# Patient Record
Sex: FEMALE | Race: WHITE
[De-identification: names, ages, dates, MRNs, and addresses within clinical notes are randomized per-mention and may not be internally consistent; named-entity substitution may affect disease eponyms.]

---

## 2018-03-17 ENCOUNTER — HOSPITAL ENCOUNTER (EMERGENCY)
Dept: HOSPITAL 63 - ER | Age: 63
Discharge: HOME | End: 2018-03-17
Payer: SELF-PAY

## 2018-03-17 VITALS — WEIGHT: 137 LBS | HEIGHT: 61 IN | BODY MASS INDEX: 25.86 KG/M2

## 2018-03-17 VITALS — DIASTOLIC BLOOD PRESSURE: 70 MMHG | SYSTOLIC BLOOD PRESSURE: 132 MMHG

## 2018-03-17 DIAGNOSIS — B34.9: Primary | ICD-10-CM

## 2018-03-17 DIAGNOSIS — J44.9: ICD-10-CM

## 2018-03-17 DIAGNOSIS — F17.210: ICD-10-CM

## 2018-03-17 DIAGNOSIS — J40: ICD-10-CM

## 2018-03-17 LAB
INFLUENZA A PATIENT: NEGATIVE
INFLUENZA B PATIENT: NEGATIVE

## 2018-03-17 PROCEDURE — 87804 INFLUENZA ASSAY W/OPTIC: CPT

## 2018-03-17 PROCEDURE — 87070 CULTURE OTHR SPECIMN AEROBIC: CPT

## 2018-03-17 PROCEDURE — 81001 URINALYSIS AUTO W/SCOPE: CPT

## 2018-03-17 PROCEDURE — 36415 COLL VENOUS BLD VENIPUNCTURE: CPT

## 2018-03-17 PROCEDURE — 99284 EMERGENCY DEPT VISIT MOD MDM: CPT

## 2018-03-17 PROCEDURE — 85025 COMPLETE CBC W/AUTO DIFF WBC: CPT

## 2018-03-17 PROCEDURE — 87880 STREP A ASSAY W/OPTIC: CPT

## 2018-03-17 PROCEDURE — 80053 COMPREHEN METABOLIC PANEL: CPT

## 2018-03-17 NOTE — ED.ADGEN
Past History


Past Medical History:  No Pertinent History, COPD, Other


Past Surgical History:  Other


Smoking:  Cigarettes


Alcohol Use:  None


Drug Use:  None





Adult General


Chief Complaint


Chief Complaint


"  I coughing up some white stuff  . and then . It was green.. I feel like I 

got the flu or something.. I ve been sick since monday.. Fever... coughing... I 

ve cut back on my smoking....." my nose runs constantly. "





HPI


HPI





Patient is a 62 year old female who presents with above hx and complaints of 

fever, chills and coughing. Pt. denies any travel or specific ill contacts.   

Pt. does smoke and has hx of chronic bronchitis.      Pt. denies any 

immunosuppression.   Pt. has hx or episodes of bronchitis in past and Hx. of 

HTN.   Pt. has hx of allergies usually seasonal or with changes in weather.  

Pt. complaints of nasal congestion and drainage.   Mild pharyngeal soreness.





Review of Systems


Review of Systems





Constitutional:Subjecteive hx of fever or chills []


Eyes: Denies change in visual acuity, redness, or eye pain []


HENT:  Hx of nasal congestion and sore throat []


Respiratory: occasional  cough  and wheezing. 


Cardiovascular: No additional information not addressed in HPI []


GI: Denies abdominal pain, nausea, vomiting, bloody stools or diarrhea []


: Denies dysuria or hematuria []


Musculoskeletal: Denies back pain or joint pain []


Integument: Denies rash or skin lesions []


Neurologic: Denies headache, focal weakness or sensory changes []


Endocrine: Denies polyuria or polydipsia []





All other systems were reviewed and found to be within normal limits, except as 

documented in this note.





Family History


Family History


Noncontributory





Current Medications


Current Medications


See Nursing for home.





Allergies


Allergies





Allergies








Coded Allergies Type Severity Reaction Last Updated Verified


 


  No Known Drug Allergies    3/18/18 No











Physical Exam


Physical Exam





Constitutional: mild  distress, non-toxic appearance. []


HENT: Normocephalic, atraumatic, bilateral external ears normal, oropharynx 

moist,mild injection of pharynx.,  no oral exudates, nose rhinorrhea. 


Eyes: PERRLA, EOMI, conjunctiva normal, no discharge. [] 


Neck: Normal range of motion, no tenderness, supple, no stridor. [] 


Cardiovascular:Heart rate regular rhythm, no murmur []


Lungs & Thorax:  Bilateral breath sounds  equal at apex with scattered wheezes 

on  auscultation []


Abdomen: Bowel sounds normal, soft, no tenderness, no masses, no pulsatile 

masses. [] 


Skin: Warm, dry, no erythema, no rash. [] 


Back: No tenderness, no CVA tenderness. [] 


Extremities: No tenderness, no cyanosis, no clubbing, ROM intact, no edema. []  

No cording. 


Neurologic: Alert and oriented X 3, normal motor function, normal sensory 

function, no focal deficits noted. []


Psychologic: Affect normal, judgement normal, mood normal. []





Current Patient Data


Lab Results





 Laboratory Tests








Test


  3/17/18


21:16


 


Influenza Type A (Rapid)


  Negative


(NEGATIVE)


 


Influenza Type B (Rapid)


  Negative


(NEGATIVE)


 


Group A Streptococcus Rapid


  Negative


(NEGATIVE)











EKG


EKG


[]





Radiology/Procedures


Radiology/Procedures


[]





Course & Med Decision Making


Course & Med Decision Making


Pertinent Labs and Imaging studies reviewed. (See chart for details).  Pt. to 

take tylenol and ibuprofen of discomfort or fever.   Push fluids.   Vit. C 

drinks.   Benadryl for congestion and nasal drainage.   STOP smoking.   .  

Follow up with primary.   Return to night even if any concerns.





[]





Final Impression


Final Impression


1. Viral Syndrome


2. Tobacco Abuse


3. Bronchitis[]


Problems:  





Dragon Disclaimer


Dragon Disclaimer


This electronic medical record was generated, in whole or in part, using a 

voice recognition dictation system.











JAYY MEANS MD Mar 17, 2018 21:03

## 2018-03-19 ENCOUNTER — HOSPITAL ENCOUNTER (EMERGENCY)
Dept: HOSPITAL 63 - ER | Age: 63
Discharge: HOME | End: 2018-03-19
Payer: SELF-PAY

## 2018-03-19 VITALS — WEIGHT: 138.67 LBS | HEIGHT: 61 IN | BODY MASS INDEX: 26.18 KG/M2

## 2018-03-19 VITALS — SYSTOLIC BLOOD PRESSURE: 147 MMHG | DIASTOLIC BLOOD PRESSURE: 84 MMHG

## 2018-03-19 DIAGNOSIS — I10: ICD-10-CM

## 2018-03-19 DIAGNOSIS — F17.210: ICD-10-CM

## 2018-03-19 DIAGNOSIS — J44.9: ICD-10-CM

## 2018-03-19 DIAGNOSIS — B34.9: Primary | ICD-10-CM

## 2018-03-19 LAB
ALBUMIN SERPL-MCNC: 3.3 G/DL (ref 3.4–5)
ALBUMIN/GLOB SERPL: 0.9 {RATIO} (ref 1–1.7)
ALP SERPL-CCNC: 106 U/L (ref 46–116)
ALT SERPL-CCNC: 36 U/L (ref 14–59)
ANION GAP SERPL CALC-SCNC: 13 MMOL/L (ref 6–14)
APTT PPP: YELLOW S
AST SERPL-CCNC: 27 U/L (ref 15–37)
BACTERIA #/AREA URNS HPF: 0 /HPF
BASOPHILS # BLD AUTO: 0 X10^3/UL (ref 0–0.2)
BASOPHILS NFR BLD: 0 % (ref 0–3)
BILIRUB SERPL-MCNC: 0.3 MG/DL (ref 0.2–1)
BILIRUB UR QL STRIP: (no result)
BUN/CREAT SERPL: 17 (ref 6–20)
CA-I SERPL ISE-MCNC: 19 MG/DL (ref 7–20)
CALCIUM SERPL-MCNC: 9.3 MG/DL (ref 8.5–10.1)
CHLORIDE SERPL-SCNC: 107 MMOL/L (ref 98–107)
CO2 SERPL-SCNC: 23 MMOL/L (ref 21–32)
CREAT SERPL-MCNC: 1.1 MG/DL (ref 0.6–1)
EOSINOPHIL NFR BLD: 0 % (ref 0–3)
EOSINOPHIL NFR BLD: 0 X10^3/UL (ref 0–0.7)
ERYTHROCYTE [DISTWIDTH] IN BLOOD BY AUTOMATED COUNT: 13.2 % (ref 11.5–14.5)
FIBRINOGEN PPP-MCNC: CLEAR MG/DL
GFR SERPLBLD BASED ON 1.73 SQ M-ARVRAT: 50.5 ML/MIN
GLOBULIN SER-MCNC: 3.8 G/DL (ref 2.2–3.8)
GLUCOSE SERPL-MCNC: 99 MG/DL (ref 70–99)
GLUCOSE UR STRIP-MCNC: (no result) MG/DL
HCT VFR BLD CALC: 40.1 % (ref 36–47)
HGB BLD-MCNC: 13.9 G/DL (ref 12–15.5)
HYALINE CASTS #/AREA URNS LPF: (no result) /HPF
LYMPHOCYTES # BLD: 2.9 X10^3/UL (ref 1–4.8)
LYMPHOCYTES NFR BLD AUTO: 47 % (ref 24–48)
MCH RBC QN AUTO: 30 PG (ref 25–35)
MCHC RBC AUTO-ENTMCNC: 35 G/DL (ref 31–37)
MCV RBC AUTO: 85 FL (ref 79–100)
MONO #: 0.5 X10^3/UL (ref 0–1.1)
MONOCYTES NFR BLD: 8 % (ref 0–9)
NEUT #: 2.8 X10^3UL (ref 1.8–7.7)
NEUTROPHILS NFR BLD AUTO: 45 % (ref 31–73)
NITRITE UR QL STRIP: (no result)
PLATELET # BLD AUTO: 196 X10^3/UL (ref 140–400)
POTASSIUM SERPL-SCNC: 3.9 MMOL/L (ref 3.5–5.1)
PROT SERPL-MCNC: 7.1 G/DL (ref 6.4–8.2)
RBC # BLD AUTO: 4.71 X10^6/UL (ref 3.5–5.4)
RBC #/AREA URNS HPF: (no result) /HPF (ref 0–2)
SODIUM SERPL-SCNC: 143 MMOL/L (ref 136–145)
SP GR UR STRIP: 1.01
SQUAMOUS #/AREA URNS LPF: (no result) /LPF
UROBILINOGEN UR-MCNC: 0.2 MG/DL
WBC # BLD AUTO: 6.2 X10^3/UL (ref 4–11)
WBC #/AREA URNS HPF: (no result) /HPF (ref 0–4)

## 2018-03-19 PROCEDURE — 99284 EMERGENCY DEPT VISIT MOD MDM: CPT

## 2018-03-19 PROCEDURE — 96374 THER/PROPH/DIAG INJ IV PUSH: CPT

## 2018-03-19 PROCEDURE — 71046 X-RAY EXAM CHEST 2 VIEWS: CPT

## 2018-03-19 PROCEDURE — 96361 HYDRATE IV INFUSION ADD-ON: CPT

## 2018-03-19 NOTE — PHYS DOC
Past History


Past Medical History:  COPD, Hypertension, Other


Past Surgical History:  Other


Smoking:  Cigarettes


Alcohol Use:  None


Drug Use:  None





Adult General


Chief Complaint


Chief Complaint:  Influenza





HPI


HPI





Patient is a 62-year-old female who presents here today complaining of 

generalized fatigue, generalized malaise, nonproductive cough, nausea, 

generalized weakness for approximately 1 week now. Patient reports that she 

came to the ER on Saturday was diagnosed with a viral illness and was told that 

she might have the flu. Patient was discharged home with therapeutic 

medications and instructed to return the ER she's not getting any better. 

Patient presents today she has been feeling worse. Daughter reports her mother 

is been having decreased by mouth intake, tactile fevers, nausea, no dysuria 

frequency or urgency, positive nonproductive cough. Sore throat. Ear pain.





Review of systems:


Constitutional: Denies fever or chills 


Eyes: Denies change in visual acuity, redness, or eye pain 


HENT: Denies nasal congestion or sore throat 


Respiratory: Denies cough or shortness of breath 





All other systems were reviewed and found to be within normal limits, except as 

documented in this note.





Physical exam: 


Constitutional: Well developed, well nourished, no acute distress, non-toxic 

appearance. 


HENT: Normocephalic, atraumatic, bilateral external ears normal,  nose normal. 


Eyes: PERRLA, EOMI, conjunctiva normal, no discharge.  


Neck: Normal range of motion, no tenderness, supple, no stridor.  


Cardiovascular: Heart rate regular rhythm,


Lungs & Thorax:  Bilateral breath sounds clear to auscultation 


Abdomen: No abdominal distention.


Skin: Warm, dry, no erythema, no rash.  


Back: Normal spinal curvature


Extremities: No tenderness, no cyanosis, no clubbing, ROM intact, no edema.  


Neurologic: Alert and oriented X 3, normal motor function, normal sensory 

function, no focal deficits noted. 


Psychologic: Affect normal, judgement normal, mood normal. 





Patient's ER physical exam was most remarkable: Lungs are clear without any 

wheezing rales or rhonchi. TMs are clear. Oropharynx clear. Abdomen was benign.


Abdomen soft nontender no rebound or guarding NABS. No Price sign, no 

tenderness to McBurney's point. Patient not present with any signs or symptoms 

of be consistent with an acute surgical abdomen.








Chest x-ray as interpreted by ER physician reveals: Normal heart no infiltrates 

or effusions.


Labs reviewed: Influenza a and B were negative. Strep screen negative.





Labs drawn today: CBC, CMP within normal limits.





Assessment and plan:


1.  62-year-old female who presents here today secondary to generalized malaise 

and likely viral illness. Patient's ER workup is been unremarkable. Patient 

reports generalized weakness, decreased sleep secondary to cough, diffuse 

malaise and myalgias. Patient's symptoms are consistent with a acute viral 

illness. Patient's currently afebrile and nontoxic appearing. Patient will be 

discharged home with some specks therapy including Tylenol as needed for her 

malaise as well as Phenergan with codeine to assist her to get some sleep at 

night with her cough. Patient be instructed to follow-up with her primary care 

physician within 2-3 days for reevaluation and to return to the ER. Discharged 

developing any shortness of breath or any other new symptoms that may be 

concerning.





Current Medications


Current Medications





Current Medications








 Medications


  (Trade)  Dose


 Ordered  Sig/Mahad  Start Time


 Stop Time Status Last Admin


Dose Admin


 


 Ketorolac


 Tromethamine


  (Toradol)  15 mg  1X  ONCE  3/19/18 21:15


 3/19/18 21:16 DC 3/19/18 21:09


15 MG


 


 Promethazine HCl/


 Codeine


  (Phenergan With


 Codeine)  10 ml  1X  ONCE  3/19/18 22:15


 3/19/18 22:16 DC 3/19/18 22:08


10 ML


 


 Sodium Chloride  1,000 ml @ 


 1,000 mls/hr  1X  ONCE  3/19/18 21:15


 3/19/18 22:14 DC 3/19/18 21:09


1,000 MLS/HR











Allergies


Allergies





Allergies








Coded Allergies Type Severity Reaction Last Updated Verified


 


  No Known Drug Allergies    3/18/18 No











Current Patient Data


Vital Signs





 Vital Signs








  Date Time  Temp Pulse Resp B/P (MAP) Pulse Ox O2 Delivery O2 Flow Rate FiO2


 


3/19/18 20:07 98.4 72 18  98 Room Air  











EKG


EKG


[]





Radiology/Procedures


Radiology/Procedures


[]





Course & Med Decision Making


Course & Med Decision Making


Pertinent Labs and Imaging studies reviewed. (See chart for details)





[]





Dragon Disclaimer


Dragon Disclaimer


This electronic medical record was generated, in whole or in part, using a 

voice recognition dictation system.





Departure


Departure:


Impression:  


 Primary Impression:  


 Viral illness


Disposition:  01 HOME, SELF-CARE


Condition:  IMPROVED


Referrals:  


PCP,NO (PCP)


Patient Instructions:  Viral Syndrome


Scripts


Promethazine HCl/Codeine (Prometh-Codein 6.25-10 mg/5 ml) 5 Ml Syrup


10 ML PO HS Y for COUGH, #120 ML


   Prov: JUSTICE LAWRENCE MD         3/19/18











JUSTICE LAWRENCE MD Mar 19, 2018 22:42

## 2018-03-20 NOTE — RAD
Chest, 2 views, 3/19/2018:



History: Cough, congestion, fatigue



Comparison is made to a study from 10/14/2012.  The heart size and pulmonary

vascularity are normal.  No pulmonary infiltrate is seen.  There is no

evidence of pleural fluid.  Mild spurring is present in the spine.  A surgical

plate and screws is evident in the lower cervical region.



IMPRESSION: No acute cardiopulmonary abnormality is detected.

## 2021-12-07 ENCOUNTER — HOSPITAL ENCOUNTER (EMERGENCY)
Dept: HOSPITAL 63 - ER | Age: 66
Discharge: ANOTHER HOSPITAL | End: 2021-12-07
Payer: MEDICARE

## 2021-12-07 VITALS — DIASTOLIC BLOOD PRESSURE: 84 MMHG | SYSTOLIC BLOOD PRESSURE: 144 MMHG

## 2021-12-07 VITALS — BODY MASS INDEX: 26.18 KG/M2 | WEIGHT: 138.67 LBS | HEIGHT: 61 IN

## 2021-12-07 DIAGNOSIS — I21.4: ICD-10-CM

## 2021-12-07 DIAGNOSIS — R79.1: ICD-10-CM

## 2021-12-07 DIAGNOSIS — I10: ICD-10-CM

## 2021-12-07 DIAGNOSIS — Z20.822: ICD-10-CM

## 2021-12-07 DIAGNOSIS — F17.210: ICD-10-CM

## 2021-12-07 DIAGNOSIS — E11.9: ICD-10-CM

## 2021-12-07 DIAGNOSIS — R74.02: ICD-10-CM

## 2021-12-07 DIAGNOSIS — J96.91: Primary | ICD-10-CM

## 2021-12-07 DIAGNOSIS — J44.9: ICD-10-CM

## 2021-12-07 LAB
ALBUMIN SERPL-MCNC: 3.4 G/DL (ref 3.4–5)
ALP SERPL-CCNC: 128 U/L (ref 46–116)
ALT SERPL-CCNC: 41 U/L (ref 14–59)
AMPHETAMINE/METHAMPHETAMINE: (no result)
ANION GAP SERPL CALC-SCNC: 13 MMOL/L (ref 6–14)
APTT PPP: YELLOW S
AST SERPL-CCNC: 60 U/L (ref 15–37)
BACTERIA #/AREA URNS HPF: 0 /HPF
BARBITURATES UR-MCNC: (no result) UG/ML
BASOPHILS # BLD AUTO: 0.1 X10^3/UL (ref 0–0.2)
BASOPHILS NFR BLD: 1 % (ref 0–3)
BENZODIAZ UR-MCNC: (no result) UG/L
BGAS PCO2: 43 MMHG (ref 35–45)
BGAS PH: 7.31 (ref 7.35–7.45)
BGAS PO2: 88 MMHG (ref 80–100)
BILIRUB DIRECT SERPL-MCNC: 0.1 MG/DL (ref 0–0.2)
BILIRUB SERPL-MCNC: 0.3 MG/DL (ref 0.2–1)
BILIRUB UR QL STRIP: (no result)
CA-I SERPL ISE-MCNC: 17 MG/DL (ref 7–20)
CALCIUM SERPL-MCNC: 7.9 MG/DL (ref 8.5–10.1)
CANNABINOIDS UR-MCNC: (no result) UG/L
CHLORIDE SERPL-SCNC: 103 MMOL/L (ref 98–107)
CHOLEST/HDLC SERPL: 8 {RATIO}
CO2 SERPL-SCNC: 22 MMOL/L (ref 21–32)
COCAINE UR-MCNC: (no result) NG/ML
CREAT SERPL-MCNC: 1 MG/DL (ref 0.6–1)
DELTA BASE BGAS: -5 MMOL/L (ref 0–3)
EOSINOPHIL NFR BLD: 0.3 X10^3/UL (ref 0–0.7)
EOSINOPHIL NFR BLD: 2 % (ref 0–3)
ERYTHROCYTE [DISTWIDTH] IN BLOOD BY AUTOMATED COUNT: 13.9 % (ref 11.5–14.5)
FIBRINOGEN PPP-MCNC: CLEAR MG/DL
GFR SERPLBLD BASED ON 1.73 SQ M-ARVRAT: 55.6 ML/MIN
GLUCOSE SERPL-MCNC: 260 MG/DL (ref 70–99)
GLUCOSE UR STRIP-MCNC: (no result) MG/DL
HCT VFR BLD CALC: 38 % (ref 36–47)
HDLC SERPL-MCNC: 29 MG/DL (ref 40–60)
HGB BLD-MCNC: 12.5 G/DL (ref 12–15.5)
INFLUENZA A PATIENT: NEGATIVE
INFLUENZA B PATIENT: NEGATIVE
LDLC: 185 MG/DL (ref 0–100)
LIPASE: 57 U/L (ref 73–393)
LYMPHOCYTES # BLD: 6.8 X10^3/UL (ref 1–4.8)
LYMPHOCYTES NFR BLD AUTO: 48 % (ref 24–48)
MAGNESIUM SERPL-MCNC: 2.2 MG/DL (ref 1.8–2.4)
MCH RBC QN AUTO: 29 PG (ref 25–35)
MCHC RBC AUTO-ENTMCNC: 33 G/DL (ref 31–37)
MCV RBC AUTO: 87 FL (ref 79–100)
METHADONE SERPL-MCNC: (no result) NG/ML
MONO #: 1 X10^3/UL (ref 0–1.1)
MONOCYTES NFR BLD: 7 % (ref 0–9)
NEUT #: 6.1 X10^3UL (ref 1.8–7.7)
NEUTROPHILS NFR BLD AUTO: 43 % (ref 31–73)
NITRITE UR QL STRIP: (no result)
O2 SAT BGAS: 96 % (ref 92–99)
O2/TOTAL GAS SETTING VFR VENT: 50 %
OPIATES UR-MCNC: (no result) NG/ML
PCP SERPL-MCNC: (no result) MG/DL
PLATELET # BLD AUTO: 307 X10^3/UL (ref 140–400)
POTASSIUM SERPL-SCNC: 4.5 MMOL/L (ref 3.5–5.1)
PROT SERPL-MCNC: 6.9 G/DL (ref 6.4–8.2)
RBC # BLD AUTO: 4.35 X10^6/UL (ref 3.5–5.4)
RBC #/AREA URNS HPF: (no result) /HPF (ref 0–2)
SODIUM SERPL-SCNC: 138 MMOL/L (ref 136–145)
SP GR UR STRIP: 1.01
SQUAMOUS #/AREA URNS LPF: (no result) /LPF
TRIGL SERPL-MCNC: 144 MG/DL (ref 0–150)
UROBILINOGEN UR-MCNC: 0.2 MG/DL
VLDLC: 28 MG/DL (ref 0–40)
WBC # BLD AUTO: 14.3 X10^3/UL (ref 4–11)
WBC #/AREA URNS HPF: (no result) /HPF (ref 0–4)

## 2021-12-07 PROCEDURE — 99292 CRITICAL CARE ADDL 30 MIN: CPT

## 2021-12-07 PROCEDURE — 36415 COLL VENOUS BLD VENIPUNCTURE: CPT

## 2021-12-07 PROCEDURE — 80076 HEPATIC FUNCTION PANEL: CPT

## 2021-12-07 PROCEDURE — 85025 COMPLETE CBC W/AUTO DIFF WBC: CPT

## 2021-12-07 PROCEDURE — 96374 THER/PROPH/DIAG INJ IV PUSH: CPT

## 2021-12-07 PROCEDURE — U0003 INFECTIOUS AGENT DETECTION BY NUCLEIC ACID (DNA OR RNA); SEVERE ACUTE RESPIRATORY SYNDROME CORONAVIRUS 2 (SARS-COV-2) (CORONAVIRUS DISEASE [COVID-19]), AMPLIFIED PROBE TECHNIQUE, MAKING USE OF HIGH THROUGHPUT TECHNOLOGIES AS DESCRIBED BY CMS-2020-01-R: HCPCS

## 2021-12-07 PROCEDURE — 94660 CPAP INITIATION&MGMT: CPT

## 2021-12-07 PROCEDURE — 96375 TX/PRO/DX INJ NEW DRUG ADDON: CPT

## 2021-12-07 PROCEDURE — 83880 ASSAY OF NATRIURETIC PEPTIDE: CPT

## 2021-12-07 PROCEDURE — C9803 HOPD COVID-19 SPEC COLLECT: HCPCS

## 2021-12-07 PROCEDURE — 83690 ASSAY OF LIPASE: CPT

## 2021-12-07 PROCEDURE — 93005 ELECTROCARDIOGRAM TRACING: CPT

## 2021-12-07 PROCEDURE — 87040 BLOOD CULTURE FOR BACTERIA: CPT

## 2021-12-07 PROCEDURE — 85730 THROMBOPLASTIN TIME PARTIAL: CPT

## 2021-12-07 PROCEDURE — 80307 DRUG TEST PRSMV CHEM ANLYZR: CPT

## 2021-12-07 PROCEDURE — 87804 INFLUENZA ASSAY W/OPTIC: CPT

## 2021-12-07 PROCEDURE — 84484 ASSAY OF TROPONIN QUANT: CPT

## 2021-12-07 PROCEDURE — 82803 BLOOD GASES ANY COMBINATION: CPT

## 2021-12-07 PROCEDURE — 85610 PROTHROMBIN TIME: CPT

## 2021-12-07 PROCEDURE — 83735 ASSAY OF MAGNESIUM: CPT

## 2021-12-07 PROCEDURE — 85379 FIBRIN DEGRADATION QUANT: CPT

## 2021-12-07 PROCEDURE — 80048 BASIC METABOLIC PNL TOTAL CA: CPT

## 2021-12-07 PROCEDURE — 84443 ASSAY THYROID STIM HORMONE: CPT

## 2021-12-07 PROCEDURE — 80061 LIPID PANEL: CPT

## 2021-12-07 PROCEDURE — 83605 ASSAY OF LACTIC ACID: CPT

## 2021-12-07 PROCEDURE — 96372 THER/PROPH/DIAG INJ SC/IM: CPT

## 2021-12-07 PROCEDURE — 82550 ASSAY OF CK (CPK): CPT

## 2021-12-07 PROCEDURE — 94640 AIRWAY INHALATION TREATMENT: CPT

## 2021-12-07 PROCEDURE — 99291 CRITICAL CARE FIRST HOUR: CPT

## 2021-12-07 PROCEDURE — 36600 WITHDRAWAL OF ARTERIAL BLOOD: CPT

## 2021-12-07 PROCEDURE — 71045 X-RAY EXAM CHEST 1 VIEW: CPT

## 2021-12-07 PROCEDURE — 81001 URINALYSIS AUTO W/SCOPE: CPT

## 2021-12-07 PROCEDURE — 87426 SARSCOV CORONAVIRUS AG IA: CPT

## 2021-12-07 NOTE — RAD
INDICATION: Reason: resp. failure / Spl. Instructions:  / History: 



COMPARISON: March 2018



FINDINGS:



Single view of chest obtained.

Enlarged cardiomediastinal silhouette.

Postoperative changes status post fusion at the lower cervical spine partially seen.

Interstitial and alveolar opacities bilaterally moderate in severity there is also some disorganized 
pulmonary markings which could be from chronic lung disease.





IMPRESSION:



*  Interstitial and alveolar opacities bilaterally which could be from edema or bilateral infiltrate.




*  Enlarged cardiomediastinal silhouette.



*  Coarsened lung markings. Could be from chronic lung disease.



Electronically signed by: Chu Ashraf MD (12/7/2021 4:51 AM) [id number]

## 2021-12-07 NOTE — PHYS DOC
Past History


Past Medical History:  COPD, Hypertension, Other


Additional Past Medical Histor:  denies


Past Surgical History:  Other


Additional Past Surgical Histo:  neck


Smoking:  Cigarettes


Alcohol Use:  None


Drug Use:  None





General Adult


EDM:


Chief Complaint:  SHORTNESS OF BREATH





HPI:


HPI:


".  This been .... coming on ....for a while..  just worse.... tonight...."





Patient is a 66 year old female who presents with obvious respiratory failure.  

Pt. reports increased dyspnea last couple days.  Much more severe tonight.  Fay

ent has had past history of COPD exacerbations.  Reportedly has never had a MI 

or CHF in the past.  Patient does have oxygen at home which she uses for her 

COPD.   Patient has had previous elevated glucose levels and hypertension.  

Patient has excess of 40-pack-year smoking history patient normally follows with

Dr. Andujar.   Pt. has had  saturation in 80%  by Paramedics, but supplement 

100% NRBM - sat into 95%.  Pt. promptly placed on BiPAP  18/5 with rapid relief 

of dyspnea and hypoxia.   Patient has completed 1 COVID vaccination.  No recent 

travel.  No specific ill contacts.  No history immunosuppression.





Review of Systems:


Review of Systems:


Constitutional:  Denies fever or chills 


Eyes:  Denies change in visual acuity 


HENT:  Denies nasal congestion or sore throat 


Respiratory: Complains of shortness of breath 


Cardiovascular:  Denies chest pain or edema 


GI:  Denies abdominal pain, nausea, vomiting, bloody stools or diarrhea 


: Denies dysuria 


Musculoskeletal:  Denies back pain or joint pain 


Integument:  Denies rash 


Neurologic:  Denies headache, focal weakness or sensory changes 


Endocrine:  Denies polyuria or polydipsia 


Lymphatic:  Denies swollen glands 


Psychiatric:  Denies depression or anxiety





Family History:


Family History:


Noncontributory to presentation





Current Medications:


Current Meds:


See nursing for home meds





Allergies:


Allergies:





Allergies








Coded Allergies Type Severity Reaction Last Updated Verified


 


  No Known Drug Allergies    21 No











Physical Exam:


PE:





Constitutional:in acute distress, non-toxic appearance. []


HENT: Normocephalic, atraumatic, bilateral external ears normal, oropharynx 

moist, no oral exudates, nose normal. []


Eyes: PERRLA, EOMI, conjunctiva normal, no discharge. [] 


Neck: Normal range of motion, no tenderness, supple, no stridor.  JVD in sitting

 position


Cardiovascular: Tachycardia heart rate regular rhythm, no murmur [] bedside 

monitor shows sinus tach with a ventricular strain pattern


Lungs & Thorax:  Bilateral breath sounds equal apex with crackles and wheezing 

throughout auscultation []


Abdomen: Bowel sounds decreased, soft, no tenderness, no masses, no pulsatile 

masses. [] 


Skin: Warm, diaphoretic, no erythema, no rash. [] 


Back: No tenderness, no CVA tenderness. [] 


Extremities: No tenderness, no cyanosis, no clubbing, ROM intact, ankle edema.  

No cording appreciated.  Arthritic changes


Neurologic: Alert and oriented X 3, moves all extremities on request, does have 

distal sensory, no focal deficits noted. []


Psychologic: Affect anxious, judgement normal, mood normal. []





Current Patient Data:


Vital Signs:





                                   Vital Signs








  Date Time  Temp Pulse Resp B/P (MAP) Pulse Ox O2 Delivery O2 Flow Rate FiO2


 


21 02:50  113 40 147/84 (105) 98 NonRebreather Mask 15.0 











EKG:


EKG:


My interpretation EKG shows a sinus rhythm at 98 bpm.  Ventricular strain 

pattern abnormal EKG time of EKG is 0317 hrs. []





Radiology/Procedures:


Radiology/Procedures:


[][facility] [address], [place] [address]


                                 [phone]


                                        


                                 IMAGING REPORT





                                     Signed





PATIENT: PEDRO GARNICA LACCOUNT: [id number]     MRN#: [id number]


: 1955           LOCATION: ER              AGE: 66


SEX: F                    EXAM DT: 21         ACCESSION#: [id number]


STATUS: REG ER            ORD. PHYSICIAN: JAYY MEANS MD


REASON: resp. failure


PROCEDURE: PORTABLE CHEST 1V








INDICATION: Reason: resp. failure / Spl. Instructions:  / History: 





COMPARISON: 2018





FINDINGS:





Single view of chest obtained.


Enlarged cardiomediastinal silhouette.


Postoperative changes status post fusion at the lower cervical spine partially 

seen.


Interstitial and alveolar opacities bilaterally moderate in severity there is 

also some disorganized pulmonary markings which could be from chronic lung 

disease.








IMPRESSION:





*  Interstitial and alveolar opacities bilaterally which could be from edema or 

bilateral infiltrate.





*  Enlarged cardiomediastinal silhouette.





*  Coarsened lung markings. Could be from chronic lung disease.





Electronically signed by: Agustín Quarles MD (2021 4:51 AM) [id number]














DICTATED AND SIGNED BY:     AGUSTÍN QUARLES MD


DATE:     219





CC: VIN ANDUJAR MD; JAYY MEANS MD ~MTH0 0





Heart Score:


C/O Chest Pain:  No


HEART Score for Chest Pain:  








HEART Score for Chest Pain Response (Comments) Value


 


History Highly Suspicious 2


 


ECG Significant ST Depression 2


 


Age > 65 2


 


Risk Factors                            1 or 2 Risk Factors 1


 


Troponin >3 x Normal Limit 2


 


Total  9








Risk Factors:


Risk Factors:  DM, Current or recent (<one month) smoker, HTN, HLP, family 

history of CAD, obesity.


Risk Scores:


Score 0 - 3:  2.5% MACE over next 6 weeks - Discharge Home


Score 4 - 6:  20.3% MACE over next 6 weeks - Admit for Clinical Observation


Score 7 - 10:  72.7% MACE over next 6 weeks - Early Invasive Strategies





Course & Med Decision Making:


Course & Med Decision Making


Pertinent Labs and Imaging studies reviewed. (See chart for details)





Discussed presentation, testing and tx. plan with  and Dr. Dowling.   

Plan hold in ED until AM shift for transfer to [facility].    Will start Azithromax, 

Solu-Medrol, Lovenox, aspirin, Nitropaste, Lasix, DuoNeb.  We will continue 

BiPAP until she has some diuresis..





Critical Care= 90 min.  Bipap titration, discussions with family and 

consultants.





EKG sent to Dr. Chavis  [phone]. 





Endorsed to Dr. Kathleen  at shift change pending transfer to [facility]. 








Impression:





1.  Respiratory failure-hypoxia


2. CHF-BNP 2365


3. Non-Stemi MI - Trop 3247


4.  Diabetes-glucose 260


5.  History of COPD


6.  Elevated Lactic Acid  3.5


7.  Elevated D-dimer 0.96





[]





Dragon Disclaimer:


Dragon Disclaimer:


This electronic medical record was generated, in whole or in part, using a voice

 recognition dictation system.





Departure


Departure:


Referrals:  


PCP,NO (PCP)





Dragon Disclaimer


This chart was dictated in whole or in part using Voice Recognition software in 

a busy, high-work load, and often noisy Emergency Department environment.  It 

may contain unintended and wholly unrecognized errors or omissions.





Dragon Disclaimer


This chart was dictated in whole or in part using Voice Recognition software in 

a busy, high-work load, and often noisy Emergency Department environment.  It 

may contain unintended and wholly unrecognized errors or omissions.











JAYY MEANS MD            Dec 7, 2021 03:01

## 2021-12-07 NOTE — PDOC2
FRIDA FORREST SHERLY 12/7/21 0814:


CARDIAC CONSULT


DATE OF CONSULT


DOS:


DATE: 12/7/21 


TIME: 08:12





REASON FOR CONSULT


Reason for Consult


Elevated troponin





SOURCE


Source:  Chart review, Patient





HPI


History of Present Illness


This is a 65 yo female who presented secondary to shortness of breath and chest 

pressure. Troponin notes to be elevated, which promoted this consult. Patient 

reports she has been short of breath for the last several days. Developed 

intermittent sharp pain in her central/left chest. Also reporting left chest 

tightness. Symptoms not specifically associated with dizziness, diaphoresis, or 

nausea/vomiting, but does reports she has been waking up frequently with night 

sweats. No known medical history, although has not been to a provider in about 

15 years. No meds at home. No recent illness, fevers. Reports chest pressure 

improved with nitro SL. Breathing has also improved s/p IV diuresis.





PAST MEDICAL HISTORY


Cardiovascular:  No pertinent hx


Heme/Onc:  No pertinent hx


Renal/:  No pertinent hx


Endocrine:  No pertinent hx





PAST SURGICAL HISTORY


Past Surgical History:  Other (cervical fusion )





FAMILY HISTORY


Family History:  Heart Disease





SOCIAL HISTORY


Smoke:  2 packs per day


ALCOHOL:  none


Drugs:  None


Lives:  with Family





CURRENT MEDICATIONS


Current Medications





Current Medications


Aspirin (Aspirin Chewable) 324 mg 1X  ONCE PO  Last administered on 12/7/21at 

04:07;  Start 12/7/21 at 03:30;  Stop 12/7/21 at 03:31;  Status DC


Albuterol/ Ipratropium (Duoneb) 3 ml 1X  ONCE NEB  Last administered on 

12/7/21at 03:31;  Start 12/7/21 at 03:30;  Stop 12/7/21 at 03:31;  Status DC


Methylprednisolone Sodium Succinate (SOLU-Medrol 125MG VIAL) 125 mg 1X  ONCE IV 

Last administered on 12/7/21at 03:47;  Start 12/7/21 at 04:00;  Stop 12/7/21 at 

04:01;  Status DC


Azithromycin (Zithromax) 500 mg 1X  ONCE PO  Last administered on 12/7/21at 

04:07;  Start 12/7/21 at 04:00;  Stop 12/7/21 at 04:01;  Status DC


Enoxaparin Sodium (Lovenox 60mg Syringe) 60 mg 1X  ONCE SQ  Last administered on

12/7/21at 04:22;  Start 12/7/21 at 04:30;  Stop 12/7/21 at 04:31;  Status DC


Nitroglycerin (Nitro-Bid Oint) 1 inch 1X  ONCE TP  Last administered on 

12/7/21at 04:23;  Start 12/7/21 at 04:30;  Stop 12/7/21 at 04:31;  Status DC


Furosemide (Lasix) 40 mg 1X  ONCE IVP  Last administered on 12/7/21at 04:22;  

Start 12/7/21 at 04:30;  Stop 12/7/21 at 04:31;  Status DC





Active Scripts


Active


Prometh-Codein 6.25-10 mg/5 ml (Promethazine HCl/Codeine) 5 Ml Syrup 10 Ml PO HS

PRN


Reported


Advair 250-50 Diskus (Fluticasone/Salmeterol) 1 Each Disk.w.dev 2 Each IH BID


No Known Medications Prior To Admisstion (Info)  Each 1 Each MC





ALLERGIES


Allergies:  


Coded Allergies:  


     No Known Drug Allergies (Unverified , 12/7/21)





ROS


Review of Systems


14 point ROS conducted with pertinent positives noted above in HPI





PHYSICAL EXAM


General:  Alert, Oriented X3, Cooperative, No acute distress


HEENT:  Atraumatic


Lungs:  Other (diminished bases)


Heart:  Regular rate


Abdomen:  Soft, No tenderness


Extremities:  No edema, Normal pulses


Skin:  No breakdown


Neuro:  Normal speech, Sensation intact


Psych/Mental Status:  Mental status NL, Mood NL


MUSCULOSKELETAL:  Osteoarthritic changes both hands





VITALS


Vital Signs





Vital Signs








  Date Time  Temp Pulse Resp B/P (MAP) Pulse Ox O2 Delivery O2 Flow Rate FiO2


 


12/7/21 06:46  77 25 121/65 (83) 98 BiPAP/CPAP  


 


12/7/21 04:32 100.0       


 


12/7/21 02:50       15.0 











LABS


LABS





Laboratory Tests








Test


 12/7/21


02:55 12/7/21


03:38 12/7/21


04:10 12/7/21


04:20


 


White Blood Count


 14.3 x10^3/uL


(4.0-11.0) 


 


 





 


Red Blood Count


 4.35 x10^6/uL


(3.50-5.40) 


 


 





 


Hemoglobin


 12.5 g/dL


(12.0-15.5) 


 


 





 


Hematocrit


 38.0 %


(36.0-47.0) 


 


 





 


Mean Corpuscular Volume 87 fL ()    


 


Mean Corpuscular Hemoglobin 29 pg (25-35)    


 


Mean Corpuscular Hemoglobin


Concent 33 g/dL


(31-37) 


 


 





 


Red Cell Distribution Width


 13.9 %


(11.5-14.5) 


 


 





 


Platelet Count


 307 x10^3/uL


(140-400) 


 


 





 


Neutrophils (%) (Auto) 43 % (31-73)    


 


Lymphocytes (%) (Auto) 48 % (24-48)    


 


Monocytes (%) (Auto) 7 % (0-9)    


 


Eosinophils (%) (Auto) 2 % (0-3)    


 


Basophils (%) (Auto) 1 % (0-3)    


 


Neutrophils # (Auto)


 6.1 x10^3uL


(1.8-7.7) 


 


 





 


Lymphocytes # (Auto)


 6.8 x10^3/uL


(1.0-4.8) 


 


 





 


Monocytes # (Auto)


 1.0 x10^3/uL


(0.0-1.1) 


 


 





 


Eosinophils # (Auto)


 0.3 x10^3/uL


(0.0-0.7) 


 


 





 


Basophils # (Auto)


 0.1 x10^3/uL


(0.0-0.2) 


 


 





 


Prothrombin Time


 9.7 SEC


(9.4-11.4) 


 


 





 


Prothromb Time International


Ratio 0.9 (0.9-1.1) 


 


 


 





 


Activated Partial


Thromboplast Time 24 SEC (23-33) 


 


 


 





 


D-Dimer (Stephanie)


 0.96 mg/L


(0.00-0.50) 


 


 





 


Sodium Level


 138 mmol/L


(136-145) 


 


 





 


Potassium Level


 4.5 mmol/L


(3.5-5.1) 


 


 





 


Chloride Level


 103 mmol/L


() 


 


 





 


Carbon Dioxide Level


 22 mmol/L


(21-32) 


 


 





 


Anion Gap 13 (6-14)    


 


Blood Urea Nitrogen


 17 mg/dL


(7-20) 


 


 





 


Creatinine


 1.0 mg/dL


(0.6-1.0) 


 


 





 


Estimated GFR


(Cockcroft-Gault) 55.6 


 


 


 





 


Glucose Level


 260 mg/dL


(70-99) 


 


 





 


Lactic Acid Level


 3.5 mmol/L


(0.4-2.0) 


 


 





 


Calcium Level


 7.9 mg/dL


(8.5-10.1) 


 


 





 


Magnesium Level


 2.2 mg/dL


(1.8-2.4) 


 


 





 


Total Bilirubin


 0.3 mg/dL


(0.2-1.0) 


 


 





 


Direct Bilirubin


 0.1 mg/dL


(0.0-0.2) 


 


 





 


Aspartate Amino Transf


(AST/SGOT) 60 U/L (15-37) 


 


 


 





 


Alanine Aminotransferase


(ALT/SGPT) 41 U/L (14-59) 


 


 


 





 


Alkaline Phosphatase


 128 U/L


() 


 


 





 


Creatine Kinase


 312 U/L


() 


 


 





 


Troponin I High Sensitivity


 3267 ng/L


(4-50) 


 


 





 


NT-Pro-B-Type Natriuretic


Peptide 2365 pg/mL


(0-124) 


 


 





 


Total Protein


 6.9 g/dL


(6.4-8.2) 


 


 





 


Albumin


 3.4 g/dL


(3.4-5.0) 


 


 





 


Lipase


 57 U/L


() 


 


 





 


Blood Gas pH


 


 7.31


(7.35-7.45) 


 





 


Blood Gas PCO2


 


 43 mmHg


(35-45) 


 





 


Blood Gas PO2


 


 88 mmHg


() 


 





 


Blood Gas HCO3


 


 22 mmol/L


(22-26) 


 





 


Arterial Bld O2 Saturation


(Calc) 


 96 % (92-99) 


 


 





 


FiO2  50 %   


 


Influenza Type A (Rapid)


 


 


 Negative


(NEGATIVE) 





 


Influenza Type B (Rapid)


 


 


 Negative


(NEGATIVE) 





 


SARS-CoV-2 Antigen (Rapid)


 


 


 Negative


(NEGATIVE) 





 


Urine Collection Type    Unknown 


 


Urine Color    Yellow 


 


Urine Clarity    Clear 


 


Urine pH    7.5 


 


Urine Specific Gravity    1.010 


 


Urine Protein


 


 


 


 Neg


(NEG-TRACE)


 


Urine Glucose (UA)


 


 


 


 Neg mg/dL


(NEG)


 


Urine Ketones (Stick)


 


 


 


 Neg mg/dL


(NEG)


 


Urine Blood    Trace (NEG) 


 


Urine Nitrite    Neg (NEG) 


 


Urine Bilirubin    Neg (NEG) 


 


Urine Urobilinogen Dipstick


 


 


 


 0.2 mg/dL (0.2


mg/dL)


 


Urine Leukocyte Esterase    Neg (NEG) 


 


Urine RBC    Occ /HPF (0-2) 


 


Urine WBC    Occ /HPF (0-4) 


 


Urine Squamous Epithelial


Cells 


 


 


 Occ /LPF 





 


Urine Bacteria    0 /HPF (0-FEW) 


 


Urine Opiates Screen    Pos (NEG) 


 


Urine Methadone Screen    Neg (NEG) 


 


Urine Barbiturates    Neg (NEG) 


 


Urine Phencyclidine Screen    Neg (NEG) 


 


Urine


Amphetamine/Methamphetamine 


 


 


 Neg (NEG) 





 


Urine Benzodiazepines Screen    Neg (NEG) 


 


Urine Cocaine Screen    Neg (NEG) 


 


Urine Cannabinoids Screen    Neg (NEG) 


 


Urine Ethyl Alcohol    Neg (NEG) 


 


Test


 12/7/21


05:55 


 


 





 


Lactic Acid Level


 0.9 mmol/L


(0.4-2.0) 


 


 





 


Troponin I High Sensitivity


 4003 ng/L


(4-50) 


 


 














ASSESSMENT/PLAN


Assessment/Plan


1.  Acute respiratory failure secondary to acute on chronic probable diastolic 

CHF; improved s/p IV diuresis 


2.  Chest pain with typical features.


3.  NSTEMI; high sensitivity trop 4003. EKG with LBBB. S/p Lovenox, ASA therapy


4.  Hyperglycemia


5.  Longstanding h/o tobaccoism with suspected COPD


6.  Lactic acidosis; most probable secondary to increase work of breathing. Now 

WNL. Also fever x1 overnight. Now normalized 








Recommendations





Lipids


ASA


Trend trop


Echo to assess LV systolic function 


Diuresis with monitoring of renal function


Given symptomatology in setting of NSTEMI, recommend further ischemic evaluation

with cardiac catheterization. R/b/a were discussed with patient and son and they

are agreeable.


Will plan for transfer to [facility] for [facility]


Keep NPO.





LINDY MEDINA MD 12/7/21 1023:








FRIDA FORREST            Dec 7, 2021 08:14


LINDY MEDINA MD        Dec 7, 2021 10:23

## 2021-12-07 NOTE — EKG
[facility] [address], [place] [address]

Test Date:    2021               Test Time:    03:17:04

Pat Name:     PEDRO GARNICA        Department:   

Patient ID:   [id number]           Room:          

Gender:       F                        Technician:   EDILBERTO

:          1955               Requested By: JAYY MEANS

Order Number: [id number]            Reading MD:   Dawson Amaral

                                 Measurements

Intervals                              Axis          

Rate:         98                       P:            56

KY:           150                      QRS:          44

QRSD:         114                      T:            248

QT:           374                                    

QTc:          479                                    

                           Interpretive Statements

SINUS RHYTHM

LEFT ATRIAL ABNORMALITY

Electronically Signed On 2021 12:53:28 CST by Dawson Amaral

## 2021-12-08 NOTE — NUR
CALLED TO GIVE COVID RESULTS. SOMEONE ELSE ANSWERED, I ASKED FOR THEM TO LET 
THE PATIENT KNOW TO GIVE US A CALL BACK

## 2021-12-17 ENCOUNTER — HOSPITAL ENCOUNTER (EMERGENCY)
Dept: HOSPITAL 63 - ER | Age: 66
Discharge: HOME | End: 2021-12-17
Payer: MEDICARE

## 2021-12-17 VITALS — BODY MASS INDEX: 26.79 KG/M2 | HEIGHT: 60 IN | WEIGHT: 136.47 LBS

## 2021-12-17 VITALS
DIASTOLIC BLOOD PRESSURE: 60 MMHG | DIASTOLIC BLOOD PRESSURE: 60 MMHG | DIASTOLIC BLOOD PRESSURE: 60 MMHG | SYSTOLIC BLOOD PRESSURE: 136 MMHG | SYSTOLIC BLOOD PRESSURE: 136 MMHG | SYSTOLIC BLOOD PRESSURE: 136 MMHG | SYSTOLIC BLOOD PRESSURE: 136 MMHG | DIASTOLIC BLOOD PRESSURE: 60 MMHG

## 2021-12-17 DIAGNOSIS — Z87.891: ICD-10-CM

## 2021-12-17 DIAGNOSIS — I10: ICD-10-CM

## 2021-12-17 DIAGNOSIS — R51.9: ICD-10-CM

## 2021-12-17 DIAGNOSIS — J44.9: ICD-10-CM

## 2021-12-17 DIAGNOSIS — J18.1: Primary | ICD-10-CM

## 2021-12-17 LAB
ALBUMIN SERPL-MCNC: 3.6 G/DL (ref 3.4–5)
ALBUMIN/GLOB SERPL: 1.1 {RATIO} (ref 1–1.7)
ALP SERPL-CCNC: 135 U/L (ref 46–116)
ALT SERPL-CCNC: 23 U/L (ref 14–59)
ANION GAP SERPL CALC-SCNC: 12 MMOL/L (ref 6–14)
AST SERPL-CCNC: 15 U/L (ref 15–37)
BASOPHILS # BLD AUTO: 0.1 X10^3/UL (ref 0–0.2)
BASOPHILS NFR BLD: 1 % (ref 0–3)
BILIRUB SERPL-MCNC: 0.5 MG/DL (ref 0.2–1)
BUN/CREAT SERPL: 17 (ref 6–20)
CA-I SERPL ISE-MCNC: 17 MG/DL (ref 7–20)
CALCIUM SERPL-MCNC: 8.4 MG/DL (ref 8.5–10.1)
CHLORIDE SERPL-SCNC: 105 MMOL/L (ref 98–107)
CO2 SERPL-SCNC: 24 MMOL/L (ref 21–32)
CREAT SERPL-MCNC: 1 MG/DL (ref 0.6–1)
EOSINOPHIL NFR BLD: 0.1 X10^3/UL (ref 0–0.7)
EOSINOPHIL NFR BLD: 1 % (ref 0–3)
ERYTHROCYTE [DISTWIDTH] IN BLOOD BY AUTOMATED COUNT: 13.5 % (ref 11.5–14.5)
GFR SERPLBLD BASED ON 1.73 SQ M-ARVRAT: 55.6 ML/MIN
GLOBULIN SER-MCNC: 3.4 G/DL (ref 2.2–3.8)
GLUCOSE SERPL-MCNC: 116 MG/DL (ref 70–99)
HCT VFR BLD CALC: 37.8 % (ref 36–47)
HGB BLD-MCNC: 12.8 G/DL (ref 12–15.5)
LYMPHOCYTES # BLD: 1.9 X10^3/UL (ref 1–4.8)
LYMPHOCYTES NFR BLD AUTO: 21 % (ref 24–48)
MCH RBC QN AUTO: 28 PG (ref 25–35)
MCHC RBC AUTO-ENTMCNC: 34 G/DL (ref 31–37)
MCV RBC AUTO: 84 FL (ref 79–100)
MONO #: 0.6 X10^3/UL (ref 0–1.1)
MONOCYTES NFR BLD: 7 % (ref 0–9)
NEUT #: 6.2 X10^3UL (ref 1.8–7.7)
NEUTROPHILS NFR BLD AUTO: 70 % (ref 31–73)
PLATELET # BLD AUTO: 253 X10^3/UL (ref 140–400)
POTASSIUM SERPL-SCNC: 3.9 MMOL/L (ref 3.5–5.1)
PROT SERPL-MCNC: 7 G/DL (ref 6.4–8.2)
RBC # BLD AUTO: 4.51 X10^6/UL (ref 3.5–5.4)
SODIUM SERPL-SCNC: 141 MMOL/L (ref 136–145)
WBC # BLD AUTO: 8.9 X10^3/UL (ref 4–11)

## 2021-12-17 PROCEDURE — 84484 ASSAY OF TROPONIN QUANT: CPT

## 2021-12-17 PROCEDURE — 99284 EMERGENCY DEPT VISIT MOD MDM: CPT

## 2021-12-17 PROCEDURE — 85025 COMPLETE CBC W/AUTO DIFF WBC: CPT

## 2021-12-17 PROCEDURE — 80053 COMPREHEN METABOLIC PANEL: CPT

## 2021-12-17 PROCEDURE — 96365 THER/PROPH/DIAG IV INF INIT: CPT

## 2021-12-17 PROCEDURE — 71046 X-RAY EXAM CHEST 2 VIEWS: CPT

## 2021-12-17 PROCEDURE — 96375 TX/PRO/DX INJ NEW DRUG ADDON: CPT

## 2021-12-17 PROCEDURE — 36415 COLL VENOUS BLD VENIPUNCTURE: CPT

## 2021-12-17 PROCEDURE — 93005 ELECTROCARDIOGRAM TRACING: CPT

## 2021-12-17 NOTE — RAD
XR CHEST 2V 



INDICATION: cough . 



COMPARISON STUDY: 12/7/2021.



FINDINGS:



Lungs: Normal lung volume. Bilateral perihilar and basilar opacities have resolved.



Pleura: No pleural effusion or pneumothorax.



Heart and Mediastinum: The cardiomediastinal silhouette is normal. The great vessels of the thorax ar
e normal.



Bones and Soft Tissues: Degenerative changes of the spine.



IMPRESSION:  

Bilateral perihilar and basilar opacities have resolved. No new consolidation.



Electronically signed by: Bora Ellsworth MD (12/17/2021 6:07 AM) [facility]

## 2021-12-17 NOTE — EKG
[facility] [address], [place] [address]

Test Date:    2021               Test Time:    06:15:39

Pat Name:     PEDRO GARNICA        Department:   

Patient ID:   [id number]           Room:          

Gender:       F                        Technician:   

:          1955               Requested By: ZELALEM GUALLPA

Order Number: [id number]            Reading MD:     

                                 Measurements

Intervals                              Axis          

Rate:                                  P:            

MD:                                    QRS:          

QRSD:                                  T:            

QT:                                                  

QTc:                                                 

                           Interpretive Statements

## 2021-12-17 NOTE — PHYS DOC
Past History


Past Medical History:  COPD, Hypertension, Other


Additional Past Medical Histor:  denies


Past Surgical History:  Other


Additional Past Surgical Histo:  cervical fusion C5-C7, stent


Smoking:  Cigarettes


Additional Smoking Information:  


quit smoking 1 week ago


Alcohol Use:  None


Drug Use:  None





General Adult


EDM:


Chief Complaint:  MULTIPLE COMPLAINTS





HPI:


HPI:


66-year-old female presents with cough.  She has had this increasing cough for 2

to 3 days.  This morning she is feeling worse than yesterday and having some 

mild shortness of breath.  She decided she should come in for evaluation.  

Patient was recently in the hospital have a stent placed 12 days ago.  She 

denies chest pain or diaphoresis.  The patient also stopped smoking about 1 week

ago.  She has been taking all her medications as prescribed.  She has not 

checked to see if she has a fever at home.





Review of Systems:


Review of Systems:


Constitutional:  Denies fever or chills 


Eyes:  Denies change in visual acuity 


HENT:  Denies nasal congestion or sore throat 


Respiratory: Cough with mild shortness of breath 


Cardiovascular:  Denies chest pain or edema 


GI:  Denies abdominal pain, nausea, vomiting, bloody stools or diarrhea 


: Denies dysuria 


Musculoskeletal:  Denies back pain or joint pain 


Integument:  Denies rash 


Neurologic:  Headache. Denies focal weakness or sensory changes 


Endocrine:  Denies polyuria or polydipsia 


Lymphatic:  Denies swollen glands 


Psychiatric:  Denies depression or anxiety





Allergies:


Allergies:





Allergies








Coded Allergies Type Severity Reaction Last Updated Verified


 


  No Known Drug Allergies    12/7/21 No











Physical Exam:


PE:





Constitutional: Well developed, well nourished, no acute distress, non-toxic 

appearance. []


HENT: Normocephalic, atraumatic, bilateral external ears normal, oropharynx 

moist, no oral exudates, nose normal. []


Eyes: PERRLA, EOMI, conjunctiva normal, no discharge. [] 


Neck: Normal range of motion, no tenderness, supple, no stridor. [] 


Cardiovascular: Heart rate 72, regular rhythm, no murmur []


Lungs & Thorax:  Bilateral breath sounds diminished.[]


Abdomen: Bowel sounds normal, soft, no tenderness, no masses, no pulsatile 

masses. [] 


Skin: Warm, dry, no erythema, no rash. [] 


Back: No tenderness, no CVA tenderness. [] 


Extremities: No tenderness, no cyanosis, no clubbing, ROM intact, no edema. [] 


Neurologic: Alert and oriented X 3, normal motor function, normal sensory 

function, no focal deficits noted. []


Psychologic: Affect normal, judgement normal, mood normal. []





Current Patient Data:


Vital Signs:





                                   Vital Signs








  Date Time  Temp Pulse Resp B/P (MAP) Pulse Ox O2 Delivery O2 Flow Rate FiO2


 


12/17/21 05:23 98.5 82 18 150/77 (101) 96 Room Air  











EKG:


EKG:


Sinus rhythm, rate 76, normal axis, no ST elevation or depression.  []





Radiology/Procedures:


Radiology/Procedures:


[]


Impressions:


XR CHEST 2V 





INDICATION: cough . 





COMPARISON STUDY: 12/7/2021.





FINDINGS:





Lungs: Normal lung volume. Bilateral perihilar and basilar opacities have 

resolved.





Pleura: No pleural effusion or pneumothorax.





Heart and Mediastinum: The cardiomediastinal silhouette is normal. The great 

vessels of the thorax are normal.





Bones and Soft Tissues: Degenerative changes of the spine.





IMPRESSION:  


Bilateral perihilar and basilar opacities have resolved. No new consolidation.





Electronically signed by: Julio Ellsworth MD (12/17/2021 6:07 AM) [facility]














DICTATED AND SIGNED BY:     JULIO ELLSWORTH MD


DATE:     12/17/21 0602





CC: ZELALEM GUALLPA DO; VIN ANDUJAR MD; BETINA PERRY DO ~MTH0 0





Heart Score:


C/O Chest Pain:  N/A


Risk Factors:


Risk Factors:  DM, Current or recent (<one month) smoker, HTN, HLP, family 

history of CAD, obesity.


Risk Scores:


Score 0 - 3:  2.5% MACE over next 6 weeks - Discharge Home


Score 4 - 6:  20.3% MACE over next 6 weeks - Admit for Clinical Observation


Score 7 - 10:  72.7% MACE over next 6 weeks - Early Invasive Strategies





Course & Med Decision Making:


Course & Med Decision Making


Pertinent Labs and Imaging studies reviewed. (See chart for details)


The patient has a left lower lobe pneumonia.  I will treat her with Rocephin and

 azithromycin.  The patient is vaccinated against COVID-19 and had her Covid 

booster 3 days ago.  The official chest x-ray read shows improving opacities and

 no new consolidation.  The patient did not mention to me being treated for 

pneumonia while in the hospital, just getting a stent.  Given the appearance of 

the left lower lobe x-ray I still think it is prudent to treat her for 

pneumonia.  The patient is EKG is negative for acute findings.  Her labs are 

unremarkable.  I have given her guaifenesin codeine cough syrup for the ED.  I 

will discharge her with azithromycin and cough syrup.  She is stable for 

discharge at this time.


[]





Dragon Disclaimer:


Dragon Disclaimer:


This electronic medical record was generated, in whole or in part, using a voice

 recognition dictation system.





Departure


Departure:


Impression:  


   Primary Impression:  


   Left lower lobe pneumonia


   Qualified Codes:  J18.9 - Pneumonia, unspecified organism


Disposition:  01 HOME / SELF CARE / HOMELESS


Condition:  STABLE


Referrals:  


VIN ANDUJAR MD (PCP)


Patient Instructions:  Pneumonia, Adult, Easy-to-Read


Scripts


Guaifenesin/Codeine Phosphate (GUAIFENESIN-CODEINE SYRUP) 118 Ml Liquid


5 ML PO Q6HRS PRN for COUGH, #120 ML


   Prov: ZELALEM GUALLPA DO         12/17/21 


Azithromycin (AZITHROMYCIN TABLET) 250 Mg Tablet


250 MG PO DAILY for ANTI-BIOTIC for 4 Days, #4 TAB 0 Refills


   start 12/17/21


   Prov: ZELALEM GUALLPA DO         12/17/21











ZELALEM GUALLPA DO                 Dec 17, 2021 06:11

## 2022-02-16 ENCOUNTER — HOSPITAL ENCOUNTER (OUTPATIENT)
Dept: HOSPITAL 63 - LAB | Age: 67
End: 2022-02-16
Attending: FAMILY MEDICINE
Payer: MEDICARE

## 2022-02-16 DIAGNOSIS — E78.01: ICD-10-CM

## 2022-02-16 DIAGNOSIS — I67.841: ICD-10-CM

## 2022-02-16 DIAGNOSIS — E03.9: ICD-10-CM

## 2022-02-16 DIAGNOSIS — I25.10: Primary | ICD-10-CM

## 2022-02-16 LAB
ALBUMIN SERPL-MCNC: 3.9 G/DL (ref 3.4–5)
ALBUMIN/GLOB SERPL: 1.1 {RATIO} (ref 1–1.7)
ALP SERPL-CCNC: 132 U/L (ref 46–116)
ALT SERPL-CCNC: 32 U/L (ref 14–59)
ANION GAP SERPL CALC-SCNC: 8 MMOL/L (ref 6–14)
AST SERPL-CCNC: 23 U/L (ref 15–37)
BASOPHILS # BLD AUTO: 0 X10^3/UL (ref 0–0.2)
BASOPHILS NFR BLD: 1 % (ref 0–3)
BILIRUB SERPL-MCNC: 0.5 MG/DL (ref 0.2–1)
BUN/CREAT SERPL: 26 (ref 6–20)
CA-I SERPL ISE-MCNC: 31 MG/DL (ref 7–20)
CALCIUM SERPL-MCNC: 9 MG/DL (ref 8.5–10.1)
CHLORIDE SERPL-SCNC: 101 MMOL/L (ref 98–107)
CHOLEST/HDLC SERPL: 3.7 {RATIO}
CO2 SERPL-SCNC: 28 MMOL/L (ref 21–32)
CREAT SERPL-MCNC: 1.2 MG/DL (ref 0.6–1)
EOSINOPHIL NFR BLD: 0.2 X10^3/UL (ref 0–0.7)
EOSINOPHIL NFR BLD: 2 % (ref 0–3)
ERYTHROCYTE [DISTWIDTH] IN BLOOD BY AUTOMATED COUNT: 16 % (ref 11.5–14.5)
GFR SERPLBLD BASED ON 1.73 SQ M-ARVRAT: 45.1 ML/MIN
GLOBULIN SER-MCNC: 3.6 G/DL (ref 2.2–3.8)
GLUCOSE SERPL-MCNC: 118 MG/DL (ref 70–99)
HCT VFR BLD CALC: 39.1 % (ref 36–47)
HDLC SERPL-MCNC: 34 MG/DL (ref 40–60)
HGB BLD-MCNC: 13.2 G/DL (ref 12–15.5)
LDLC: 70 MG/DL (ref 0–100)
LYMPHOCYTES # BLD: 3.3 X10^3/UL (ref 1–4.8)
LYMPHOCYTES NFR BLD AUTO: 43 % (ref 24–48)
MCH RBC QN AUTO: 29 PG (ref 25–35)
MCHC RBC AUTO-ENTMCNC: 34 G/DL (ref 31–37)
MCV RBC AUTO: 84 FL (ref 79–100)
MONO #: 0.4 X10^3/UL (ref 0–1.1)
MONOCYTES NFR BLD: 5 % (ref 0–9)
NEUT #: 3.8 X10^3UL (ref 1.8–7.7)
NEUTROPHILS NFR BLD AUTO: 49 % (ref 31–73)
PLATELET # BLD AUTO: 245 X10^3/UL (ref 140–400)
POTASSIUM SERPL-SCNC: 3.6 MMOL/L (ref 3.5–5.1)
PROT SERPL-MCNC: 7.5 G/DL (ref 6.4–8.2)
RBC # BLD AUTO: 4.65 X10^6/UL (ref 3.5–5.4)
SODIUM SERPL-SCNC: 137 MMOL/L (ref 136–145)
T4 FREE SERPL-MCNC: 0.79 NG/DL (ref 0.76–1.46)
THYROID STIM HORMONE (TSH): 0.6 UIU/ML (ref 0.36–3.74)
TRIGL SERPL-MCNC: 109 MG/DL (ref 0–150)
VLDLC: 22 MG/DL (ref 0–40)
WBC # BLD AUTO: 7.7 X10^3/UL (ref 4–11)

## 2022-02-16 PROCEDURE — 85025 COMPLETE CBC W/AUTO DIFF WBC: CPT

## 2022-02-16 PROCEDURE — 85730 THROMBOPLASTIN TIME PARTIAL: CPT

## 2022-02-16 PROCEDURE — 80053 COMPREHEN METABOLIC PANEL: CPT

## 2022-02-16 PROCEDURE — 84439 ASSAY OF FREE THYROXINE: CPT

## 2022-02-16 PROCEDURE — 85610 PROTHROMBIN TIME: CPT

## 2022-02-16 PROCEDURE — 36415 COLL VENOUS BLD VENIPUNCTURE: CPT

## 2022-02-16 PROCEDURE — 80061 LIPID PANEL: CPT

## 2022-02-16 PROCEDURE — 84484 ASSAY OF TROPONIN QUANT: CPT

## 2022-02-16 PROCEDURE — 84443 ASSAY THYROID STIM HORMONE: CPT

## 2022-05-03 ENCOUNTER — HOSPITAL ENCOUNTER (OUTPATIENT)
Dept: HOSPITAL 63 - ECHO | Age: 67
End: 2022-05-03
Payer: MEDICARE

## 2022-05-03 ENCOUNTER — HOSPITAL ENCOUNTER (OUTPATIENT)
Dept: HOSPITAL 63 - LAB | Age: 67
End: 2022-05-03
Payer: MEDICARE

## 2022-05-03 DIAGNOSIS — I51.7: Primary | ICD-10-CM

## 2022-05-03 DIAGNOSIS — I25.10: ICD-10-CM

## 2022-05-03 DIAGNOSIS — I25.10: Primary | ICD-10-CM

## 2022-05-03 LAB
ALBUMIN SERPL-MCNC: 4.1 G/DL (ref 3.4–5)
ALBUMIN/GLOB SERPL: 1.1 {RATIO} (ref 1–1.7)
ALP SERPL-CCNC: 118 U/L (ref 46–116)
ALT SERPL-CCNC: 36 U/L (ref 14–59)
ANION GAP SERPL CALC-SCNC: 10 MMOL/L (ref 6–14)
AST SERPL-CCNC: 27 U/L (ref 15–37)
BASOPHILS # BLD AUTO: 0 X10^3/UL (ref 0–0.2)
BASOPHILS NFR BLD: 1 % (ref 0–3)
BILIRUB SERPL-MCNC: 0.5 MG/DL (ref 0.2–1)
BUN/CREAT SERPL: 26 (ref 6–20)
CA-I SERPL ISE-MCNC: 31 MG/DL (ref 7–20)
CALCIUM SERPL-MCNC: 9.3 MG/DL (ref 8.5–10.1)
CHLORIDE SERPL-SCNC: 103 MMOL/L (ref 98–107)
CO2 SERPL-SCNC: 26 MMOL/L (ref 21–32)
CREAT SERPL-MCNC: 1.2 MG/DL (ref 0.6–1)
EOSINOPHIL NFR BLD: 0.3 X10^3/UL (ref 0–0.7)
EOSINOPHIL NFR BLD: 3 % (ref 0–3)
ERYTHROCYTE [DISTWIDTH] IN BLOOD BY AUTOMATED COUNT: 13.4 % (ref 11.5–14.5)
GFR SERPLBLD BASED ON 1.73 SQ M-ARVRAT: 45.1 ML/MIN
GLOBULIN SER-MCNC: 3.7 G/DL (ref 2.2–3.8)
GLUCOSE SERPL-MCNC: 114 MG/DL (ref 70–99)
HCT VFR BLD CALC: 44.3 % (ref 36–47)
HGB BLD-MCNC: 14.8 G/DL (ref 12–15.5)
LYMPHOCYTES # BLD: 3.3 X10^3/UL (ref 1–4.8)
LYMPHOCYTES NFR BLD AUTO: 38 % (ref 24–48)
MCH RBC QN AUTO: 30 PG (ref 25–35)
MCHC RBC AUTO-ENTMCNC: 33 G/DL (ref 31–37)
MCV RBC AUTO: 88 FL (ref 79–100)
MONO #: 0.5 X10^3/UL (ref 0–1.1)
MONOCYTES NFR BLD: 6 % (ref 0–9)
NEUT #: 4.6 X10^3UL (ref 1.8–7.7)
NEUTROPHILS NFR BLD AUTO: 53 % (ref 31–73)
PLATELET # BLD AUTO: 261 X10^3/UL (ref 140–400)
POTASSIUM SERPL-SCNC: 4 MMOL/L (ref 3.5–5.1)
PROT SERPL-MCNC: 7.8 G/DL (ref 6.4–8.2)
RBC # BLD AUTO: 5.02 X10^6/UL (ref 3.5–5.4)
SODIUM SERPL-SCNC: 139 MMOL/L (ref 136–145)
WBC # BLD AUTO: 8.7 X10^3/UL (ref 4–11)

## 2022-05-03 PROCEDURE — 93306 TTE W/DOPPLER COMPLETE: CPT

## 2022-05-03 PROCEDURE — 36415 COLL VENOUS BLD VENIPUNCTURE: CPT

## 2022-05-03 PROCEDURE — 80061 LIPID PANEL: CPT

## 2022-05-03 PROCEDURE — 85025 COMPLETE CBC W/AUTO DIFF WBC: CPT

## 2022-05-03 PROCEDURE — 80053 COMPREHEN METABOLIC PANEL: CPT

## 2022-05-03 NOTE — CARD
MR#: [id number]

Account#: [id number]

Accession#: [id number]

Date of Study: 05/03/2022

Ordering Physician: LINDY MEDINA, 

Referring Physician: LINDY MEDINA, 

Tech: Vaibhav Silva [facility]





--------------- APPROVED REPORT --------------





EXAM: Two-dimensional and M-mode echocardiogram with Doppler and color Doppler.



Other Information 

Quality : AverageHR: 58bpm

Rhythm : NSR



INDICATION

Cardiac Disease: CAD 



RISK FACTORS

Hypertension 

Hyperlipidemia

Smoking 



2D DIMENSIONS 

Left Atrium(2D)3.6 (1.6-4.0cm)IVSd1.2 (0.7-1.1cm)

Aortic Root(2D)3.1 (2.0-3.7cm)LVDd5.3 (3.9-5.9cm)

LVOT Diameter1.9 (1.8-2.4cm)PWd1.0 (0.7-1.1cm)

LA Pqfmej55 (18-58mL)LVDs4.5 (2.5-4.0cm)

FS (%) 14.5 %SV40.9 ml

LVEF(%)30.6 (>50%)



Aortic Valve

AoV Peak Amanuel.105.7cm/sAoV VTI21.6cm

AO Peak GR.4.5mmHgLVOT Peak Amanuel.99.6cm/s

LVOT  VTI 18.99cmAO Mean GR.2mmHg

YARED (VMAX)2.87ny7IYW   (VTI)2.59cm2



Mitral Valve

MV E Ildrulhr01.3cm/sMV DECEL [id number]

MV A Gfhmpahz38.5cm/sE/A  Ratio0.5



Pulmonary Valve

PV Peak Rgvsbndq41.7cm/sPV Peak Grad.3mmHg



Tricuspid Valve

TR P. Bfdkngyr667qn/sTR Peak Gr.27mmHg



Pulmonary Vein

S1 Aoboowht25.3cm/sD2 Tunjynsz76.7cm/s



 LEFT VENTRICLE 

The left ventricle is normal size. There is mild concentric left ventricular hypertrophy. The systoli
c function is mildly impaired. The Ejection Fraction is 45-50%. There is inferior wall h;ypokinesis. 
Transmitral Doppler flow pattern is Grade I-abnormal relaxation pattern. No left ventricle thrombus n
oted on this study. There is no ventricular septal defect visualized. There is no left ventricular an
eurysm. There is no mass noted in the left ventricle.



 RIGHT VENTRICLE 

The right ventricle is normal size. There is normal right ventricular wall thickness. The right ventr
icular systolic function is normal.



 ATRIA 

The left atrium size is normal. The right atrium size is normal. The interatrial septum is intact wit
h no evidence for an atrial septal defect or patent foramen ovale as noted on 2-D or Doppler imaging.




 AORTIC VALVE 

The aortic valve is normal in structure and function. Doppler and Color Flow revealed no significant 
aortic regurgitation. There is no significant aortic valvular stenosis. There is no aortic valvular v
egetation.



 MITRAL VALVE 

The mitral valve is thickened but opens well. There is no evidence of mitral valve prolapse. There is
 no mitral valve stenosis. Doppler and Color-flow revealed trace to mild mitral regurgitation.



 TRICUSPID VALVE 

The tricuspid valve is normal in structure and function. Doppler and Color Flow revealed trace tricus
pid regurgitation. There is no tricuspid valve prolapse or vegetation. There is no tricuspid valve st
enosis.



 PULMONIC VALVE 

Doppler and Color Flow revealed no pulmonic valvular regurgitation. There is no pulmonic valvular eliot
nosis.



 GREAT VESSELS 

The aortic root is normal in size. The ascending aorta is normal in size. The IVC is normal in size a
nd collapses >50% with inspiration.



 PERICARDIAL EFFUSION 

There is no pleural effusion. There is no evidence of significant pericardial effusion.



Critical Notification

Critical Value: No



<Conclusion>

The systolic function is mildly impaired. The Ejection Fraction is 45-50%.

There is inferior wall hypokinesis.



Signed by : Lindy Medina, 

Electronically Approved : 05/03/2022 17:39:47

## 2022-05-04 LAB
CHOLEST/HDLC SERPL: 4.3 {RATIO}
HDLC SERPL-MCNC: 40 MG/DL (ref 40–60)
LDLC: 96 MG/DL (ref 0–100)
TRIGL SERPL-MCNC: 179 MG/DL (ref 0–150)
VLDLC: 36 MG/DL (ref 0–40)